# Patient Record
Sex: FEMALE | Race: WHITE
[De-identification: names, ages, dates, MRNs, and addresses within clinical notes are randomized per-mention and may not be internally consistent; named-entity substitution may affect disease eponyms.]

---

## 2017-12-20 ENCOUNTER — HOSPITAL ENCOUNTER (OUTPATIENT)
Dept: HOSPITAL 76 - EMS | Age: 77
Discharge: ANOTHER HOSPITAL | End: 2017-12-20
Attending: SURGERY
Payer: MEDICARE

## 2017-12-20 ENCOUNTER — HOSPITAL ENCOUNTER (EMERGENCY)
Dept: HOSPITAL 76 - ED | Age: 77
Discharge: HOME | End: 2017-12-20
Payer: MEDICARE

## 2017-12-20 VITALS — DIASTOLIC BLOOD PRESSURE: 74 MMHG | SYSTOLIC BLOOD PRESSURE: 137 MMHG

## 2017-12-20 DIAGNOSIS — R41.82: Primary | ICD-10-CM

## 2017-12-20 DIAGNOSIS — T50.995A: ICD-10-CM

## 2017-12-20 DIAGNOSIS — E87.6: ICD-10-CM

## 2017-12-20 DIAGNOSIS — M79.7: ICD-10-CM
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
## 2017-12-20 DIAGNOSIS — M19.90: ICD-10-CM

## 2017-12-20 DIAGNOSIS — J45.909: ICD-10-CM

## 2017-12-20 LAB
ANION GAP SERPL CALCULATED.4IONS-SCNC: 11 MMOL/L (ref 6–13)
APAP SERPL-MCNC: < 10 UG/ML (ref 10–30)
BASOPHILS NFR BLD AUTO: 0 10^3/UL (ref 0–0.1)
BASOPHILS NFR BLD AUTO: 0.6 %
BUN SERPL-MCNC: 13 MG/DL (ref 6–20)
CALCIUM UR-MCNC: 8.8 MG/DL (ref 8.5–10.3)
CHLORIDE SERPL-SCNC: 94 MMOL/L (ref 101–111)
CO2 SERPL-SCNC: 27 MMOL/L (ref 21–32)
CREAT SERPLBLD-SCNC: 0.9 MG/DL (ref 0.4–1)
EOSINOPHIL # BLD AUTO: 0.1 10^3/UL (ref 0–0.7)
EOSINOPHIL NFR BLD AUTO: 0.9 %
ERYTHROCYTE [DISTWIDTH] IN BLOOD BY AUTOMATED COUNT: 13.1 % (ref 12–15)
GFRSERPLBLD MDRD-ARVRAT: 61 ML/MIN/{1.73_M2} (ref 89–?)
GLUCOSE SERPL-MCNC: 137 MG/DL (ref 70–100)
HCT VFR BLD AUTO: 38.2 % (ref 37–47)
HGB UR QL STRIP: 13.1 G/DL (ref 12–16)
LYMPHOCYTES # SPEC AUTO: 2.6 10^3/UL (ref 1.5–3.5)
LYMPHOCYTES NFR BLD AUTO: 34.3 %
MCH RBC QN AUTO: 31.1 PG (ref 27–31)
MCHC RBC AUTO-ENTMCNC: 34.4 G/DL (ref 32–36)
MCV RBC AUTO: 90.5 FL (ref 81–99)
MONOCYTES # BLD AUTO: 0.7 10^3/UL (ref 0–1)
MONOCYTES NFR BLD AUTO: 9.7 %
NEUTROPHILS # BLD AUTO: 4.1 10^3/UL (ref 1.5–6.6)
NEUTROPHILS # SNV AUTO: 7.6 X10^3/UL (ref 4.8–10.8)
NEUTROPHILS NFR BLD AUTO: 54.5 %
NRBC # BLD AUTO: 0 /100WBC
PDW BLD AUTO: 6.9 FL (ref 7.9–10.8)
POTASSIUM SERPL-SCNC: 2.8 MMOL/L (ref 3.5–5)
RBC MAR: 4.22 10^6/UL (ref 4.2–5.4)
SALICYLATES SERPL-MCNC: < 6 MG/DL
SODIUM SERPLBLD-SCNC: 132 MMOL/L (ref 135–145)
WBC # BLD: 7.6 X10^3/UL

## 2017-12-20 PROCEDURE — 87086 URINE CULTURE/COLONY COUNT: CPT

## 2017-12-20 PROCEDURE — 96374 THER/PROPH/DIAG INJ IV PUSH: CPT

## 2017-12-20 PROCEDURE — 36415 COLL VENOUS BLD VENIPUNCTURE: CPT

## 2017-12-20 PROCEDURE — 80306 DRUG TEST PRSMV INSTRMNT: CPT

## 2017-12-20 PROCEDURE — 96361 HYDRATE IV INFUSION ADD-ON: CPT

## 2017-12-20 PROCEDURE — 85025 COMPLETE CBC W/AUTO DIFF WBC: CPT

## 2017-12-20 PROCEDURE — 80329 ANALGESICS NON-OPIOID 1 OR 2: CPT

## 2017-12-20 PROCEDURE — 99283 EMERGENCY DEPT VISIT LOW MDM: CPT

## 2017-12-20 PROCEDURE — 80320 DRUG SCREEN QUANTALCOHOLS: CPT

## 2017-12-20 PROCEDURE — 80307 DRUG TEST PRSMV CHEM ANLYZR: CPT

## 2017-12-20 PROCEDURE — 93005 ELECTROCARDIOGRAM TRACING: CPT

## 2017-12-20 PROCEDURE — 99284 EMERGENCY DEPT VISIT MOD MDM: CPT

## 2017-12-20 PROCEDURE — 81003 URINALYSIS AUTO W/O SCOPE: CPT

## 2017-12-20 PROCEDURE — 81001 URINALYSIS AUTO W/SCOPE: CPT

## 2017-12-20 PROCEDURE — 80048 BASIC METABOLIC PNL TOTAL CA: CPT

## 2017-12-20 NOTE — ED PHYSICIAN DOCUMENTATION
History of Present Illness





- Stated complaint


Stated Complaint: ALOC





- Chief complaint


Chief Complaint: Neuro





- Additonal information


Additional information: 





hx from EMS


77 female


suffers from fibromyALGIA TAKES OXYCODONE


this AM her  gave her a dose of a black substance that a friend bought 

at the marijuana store and she promptly developed AMS and NV


no fall or injury








Review of Systems


Constitutional: denies: Fever


Cardiac: denies: Chest pain / pressure


Respiratory: denies: Cough


GI: reports: Vomiting.  denies: Abdominal Pain


Neurologic: reports: Confused, Altered mental status


Endocrine: denies: Easy bruising / bleeding


Immunocompromised: denies: Immunocompromised





PD PAST MEDICAL HISTORY





- Past Medical History


Cardiovascular: None


Respiratory: Asthma


Neuro: None


Endocrine/Autoimmune: None


Musculoskeletal: Osteoarthritis, Fibromyalgia





- Past Surgical History


Past Surgical History: Yes


HEENT: Cataracts





- Present Medications


Home Medications: 


 Ambulatory Orders











 Medication  Instructions  Recorded  Confirmed


 


Fluticasone Propionate [Flovent 1 inh INH DAILY 03/30/16 12/20/17





Diskus]   


 


Fluticasone [Flonase] 1 spray VICKY BID 03/30/16 12/20/17


 


Hydrochlorothiazide 25 mg PO DAILY 03/30/16 12/20/17


 


Lisinopril 10 mg PO DAILY 03/30/16 12/20/17


 


Methocarbamol [Robaxin] 500 mg PO Q6H PRN #25 tablet 03/30/16 12/20/17


 


Salmeterol Xinafoate [Serevent 1 inhaler ORAL DAILY 03/30/16 12/20/17





Diskus]   


 


Zolpidem [Ambien] 5 mg PO DAILY 03/30/16 12/20/17


 


Ciprofloxacin HCl [Cipro] 500 mg PO BID #14 tablet 04/05/16 12/20/17


 


oxyCODONE/ACET 5/325 [Percocet 5 1 tab ORAL Q6HR PRN 04/05/16 12/20/17





mg/325 mg]   














- Allergies


Allergies/Adverse Reactions: 


 Allergies











Allergy/AdvReac Type Severity Reaction Status Date / Time


 


amoxicillin trihydrate * Allergy  Hives Verified 04/05/16 09:22





[From Augmentin]     


 


potassium clavulanate * Allergy  Hives Verified 04/05/16 09:22





[From Augmentin]     














- Social History


Does the pt smoke?: No


Smoking Status: Never smoker


Does the pt drink ETOH?: No


Does the pt have substance abuse?: No





- Immunizations


Immunizations are current?: Yes





PD ED PE NORMAL





- Vitals


Vital signs reviewed: Yes





- General


General: Other (very drowsy, irritable, uncooperative, occ jerking movements)





- HEENT


HEENT: PERRL (2)





- Neck


Neck: Supple, no meningeal sign





- Cardiac


Cardiac: RRR





- Respiratory


Respiratory: No respiratory distress, Clear bilaterally





- Abdomen


Abdomen: Soft, Non tender





- Derm


Derm: Normal color





- Neuro


Neuro: Other (wekaly moves all ext, wont follow commands or answer questions 

for better neuro exam).  No: Alert and oriented X 3





Results





- Vitals


Vitals: 


 Vital Signs - 24 hr











  12/20/17 12/20/17 12/20/17





  10:07 10:50 12:16


 


Temperature   36.1 C L


 


Heart Rate 89 62 76


 


Respiratory 18 18 16





Rate   


 


Blood Pressure 149/81 H 138/75 H 137/74 H


 


O2 Saturation 95 94 100








 Oxygen











O2 Source                      Nasal cannula

















- EKG (time done)


  ** 1047


Rate: Rate (enter#) (62)


Rhythm: NSR


Axis: Normal


Intervals: Normal DC


Ischemia: Non specific changes (diffusely slightly flat T waves)


Other comments: Other comments (NSR nl QRS nl QT (reason for getting EKG in tox 

setting))





- Labs


Labs: 


 Laboratory Tests











  12/20/17 12/20/17





  10:34 10:34


 


WBC  7.6 


 


RBC  4.22 


 


Hgb  13.1 


 


Hct  38.2 


 


MCV  90.5 


 


MCH  31.1 H 


 


MCHC  34.4 


 


RDW  13.1 


 


Plt Count  376 


 


MPV  6.9 L 


 


Neut #  4.1 


 


Lymph #  2.6 


 


Mono #  0.7 


 


Eos #  0.1 


 


Baso #  0.0 


 


Absolute Nucleated RBC  0.00 


 


Nucleated RBC %  0.0 


 


Sodium   132 L


 


Potassium   2.8 L


 


Chloride   94 L


 


Carbon Dioxide   27


 


Anion Gap   11.0


 


BUN   13


 


Creatinine   0.9


 


Estimated GFR (MDRD)   61 L


 


Glucose   137 H


 


Calcium   8.8


 


Salicylates   < 6.0


 


Acetaminophen   < 10 L


 


Ethyl Alcohol   < 5.0














PD MEDICAL DECISION MAKING





- ED course


ED course: 





family called marijuana store and learned the meds was a combo or marijuana 

deriv and nurse called poison control who advised sx are as expected for this 

ingestion and tx was simply supportive care and obs - pt given zofran and 

observed and got better


labs reviewed and fine except low K which was repleted and low Na which is not 

new





Departure





- Departure


Disposition: 01 Home, Self Care


Clinical Impression: 


Medication reaction


Qualifiers:


 Encounter type: initial encounter Qualified Code(s): T88.7XXA - Unspecified 

adverse effect of drug or medicament, initial encounter





Condition: Good


Follow-Up: 


Franc Zurita MD [Primary Care Provider] - 


Comments: 


You suffered an adverse reaction the drug that you took


The case was discussed with poison control and no further treatment is needed 

beyond observing you  until the symptoms cleared.


Your labs were fine except for low potassium which we replaced - please follow 

up with your PMD to recheck your potassium next week

## 2018-03-29 ENCOUNTER — HOSPITAL ENCOUNTER (OUTPATIENT)
Dept: HOSPITAL 76 - EMS | Age: 78
Discharge: ANOTHER HOSPITAL | End: 2018-03-29
Attending: SURGERY
Payer: MEDICARE

## 2018-03-29 ENCOUNTER — HOSPITAL ENCOUNTER (INPATIENT)
Dept: HOSPITAL 76 - ED | Age: 78
LOS: 2 days | Discharge: HOME | DRG: 194 | End: 2018-03-31
Attending: INTERNAL MEDICINE | Admitting: INTERNAL MEDICINE
Payer: MEDICARE

## 2018-03-29 DIAGNOSIS — J20.8: ICD-10-CM

## 2018-03-29 DIAGNOSIS — Z79.51: ICD-10-CM

## 2018-03-29 DIAGNOSIS — J44.0: ICD-10-CM

## 2018-03-29 DIAGNOSIS — T48.6X5A: ICD-10-CM

## 2018-03-29 DIAGNOSIS — M79.7: ICD-10-CM

## 2018-03-29 DIAGNOSIS — R19.7: ICD-10-CM

## 2018-03-29 DIAGNOSIS — R09.02: ICD-10-CM

## 2018-03-29 DIAGNOSIS — R00.0: ICD-10-CM

## 2018-03-29 DIAGNOSIS — I10: ICD-10-CM

## 2018-03-29 DIAGNOSIS — E87.1: ICD-10-CM

## 2018-03-29 DIAGNOSIS — J44.1: ICD-10-CM

## 2018-03-29 DIAGNOSIS — Z87.891: ICD-10-CM

## 2018-03-29 DIAGNOSIS — J45.901: ICD-10-CM

## 2018-03-29 DIAGNOSIS — Z66: ICD-10-CM

## 2018-03-29 DIAGNOSIS — Z79.899: ICD-10-CM
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
## 2018-03-29 DIAGNOSIS — J10.1: Primary | ICD-10-CM

## 2018-03-29 DIAGNOSIS — M19.90: ICD-10-CM

## 2018-03-29 DIAGNOSIS — R05: Primary | ICD-10-CM

## 2018-03-29 DIAGNOSIS — R41.0: ICD-10-CM

## 2018-03-29 DIAGNOSIS — E87.6: ICD-10-CM

## 2018-03-29 DIAGNOSIS — Z79.891: ICD-10-CM

## 2018-03-29 DIAGNOSIS — J20.9: ICD-10-CM

## 2018-03-29 LAB
ANION GAP SERPL CALCULATED.4IONS-SCNC: 11 MMOL/L (ref 6–13)
ARTERIAL PATENCY WRIST A: POSITIVE
BASE EXCESS BLDMV CALC-SCNC: 4.7 MMOL/L (ref -2–3)
BASOPHILS NFR BLD AUTO: 0 10^3/UL (ref 0–0.1)
BASOPHILS NFR BLD AUTO: 0.2 %
BUN SERPL-MCNC: 18 MG/DL (ref 6–20)
CALCIUM UR-MCNC: 8.5 MG/DL (ref 8.5–10.3)
CHLORIDE SERPL-SCNC: 86 MMOL/L (ref 101–111)
CO2 BLDA CALC-SCNC: 29.3 MMOL/L (ref 21–29)
CO2 SERPL-SCNC: 26 MMOL/L (ref 21–32)
CREAT SERPLBLD-SCNC: 1.1 MG/DL (ref 0.4–1)
DEPRECATED HCO3 PLAS-SCNC: 28.2 MMOL/L (ref 22–26)
EOSINOPHIL # BLD AUTO: 0 10^3/UL (ref 0–0.7)
EOSINOPHIL NFR BLD AUTO: 0 %
ERYTHROCYTE [DISTWIDTH] IN BLOOD BY AUTOMATED COUNT: 13.4 % (ref 12–15)
GFRSERPLBLD MDRD-ARVRAT: 48 ML/MIN/{1.73_M2} (ref 89–?)
GLUCOSE SERPL-MCNC: 122 MG/DL (ref 70–100)
HGB UR QL STRIP: 13 G/DL (ref 12–16)
INFLUENZA A & B AG INTERPRET: (no result)
LYMPHOCYTES # SPEC AUTO: 0.4 10^3/UL (ref 1.5–3.5)
LYMPHOCYTES NFR BLD AUTO: 6.1 %
MCH RBC QN AUTO: 31.4 PG (ref 27–31)
MCHC RBC AUTO-ENTMCNC: 35.3 G/DL (ref 32–36)
MCV RBC AUTO: 89 FL (ref 81–99)
MONOCYTES # BLD AUTO: 0.3 10^3/UL (ref 0–1)
MONOCYTES NFR BLD AUTO: 4.1 %
NEUTROPHILS # BLD AUTO: 5.6 10^3/UL (ref 1.5–6.6)
NEUTROPHILS # SNV AUTO: 6.3 X10^3/UL (ref 4.8–10.8)
NEUTROPHILS NFR BLD AUTO: 89.6 %
PCO2 TEMP ADJ BLDCOA: 38 MMHG (ref 34–45)
PDW BLD AUTO: 6.9 FL (ref 7.9–10.8)
PH TEMP ADJ BLDA: 7.49 [PH] (ref 7.35–7.45)
PLATELET # BLD: 238 10^3/UL (ref 130–450)
PO2 TEMP ADJ BLDCOA: 94 MMHG (ref 80–100)
RBC MAR: 4.15 10^6/UL (ref 4.2–5.4)
SAO2 % BLDA FROM PO2: 97 % (ref 94–98)
SODIUM SERPLBLD-SCNC: 123 MMOL/L (ref 135–145)

## 2018-03-29 PROCEDURE — 94640 AIRWAY INHALATION TREATMENT: CPT

## 2018-03-29 PROCEDURE — 87205 SMEAR GRAM STAIN: CPT

## 2018-03-29 PROCEDURE — 36600 WITHDRAWAL OF ARTERIAL BLOOD: CPT

## 2018-03-29 PROCEDURE — 87275 INFLUENZA B AG IF: CPT

## 2018-03-29 PROCEDURE — 87493 C DIFF AMPLIFIED PROBE: CPT

## 2018-03-29 PROCEDURE — 82803 BLOOD GASES ANY COMBINATION: CPT

## 2018-03-29 PROCEDURE — 71045 X-RAY EXAM CHEST 1 VIEW: CPT

## 2018-03-29 PROCEDURE — 99283 EMERGENCY DEPT VISIT LOW MDM: CPT

## 2018-03-29 PROCEDURE — 80048 BASIC METABOLIC PNL TOTAL CA: CPT

## 2018-03-29 PROCEDURE — 80053 COMPREHEN METABOLIC PANEL: CPT

## 2018-03-29 PROCEDURE — 87046 STOOL CULTR AEROBIC BACT EA: CPT

## 2018-03-29 PROCEDURE — 94761 N-INVAS EAR/PLS OXIMETRY MLT: CPT

## 2018-03-29 PROCEDURE — 36415 COLL VENOUS BLD VENIPUNCTURE: CPT

## 2018-03-29 PROCEDURE — 87045 FECES CULTURE AEROBIC BACT: CPT

## 2018-03-29 PROCEDURE — 71046 X-RAY EXAM CHEST 2 VIEWS: CPT

## 2018-03-29 PROCEDURE — 87070 CULTURE OTHR SPECIMN AEROBIC: CPT

## 2018-03-29 PROCEDURE — 99284 EMERGENCY DEPT VISIT MOD MDM: CPT

## 2018-03-29 PROCEDURE — 93005 ELECTROCARDIOGRAM TRACING: CPT

## 2018-03-29 PROCEDURE — 87276 INFLUENZA A AG IF: CPT

## 2018-03-29 PROCEDURE — 87040 BLOOD CULTURE FOR BACTERIA: CPT

## 2018-03-29 PROCEDURE — 85025 COMPLETE CBC W/AUTO DIFF WBC: CPT

## 2018-03-29 RX ADMIN — BUDESONIDE SCH MG: 0.5 SUSPENSION RESPIRATORY (INHALATION) at 20:31

## 2018-03-29 RX ADMIN — OSELTAMIVIR PHOSPHATE SCH MG: 75 CAPSULE ORAL at 14:04

## 2018-03-29 RX ADMIN — POTASSIUM CHLORIDE SCH MLS/HR: 7.46 INJECTION, SOLUTION INTRAVENOUS at 22:35

## 2018-03-29 RX ADMIN — LEVALBUTEROL SCH MG: 1.25 SOLUTION RESPIRATORY (INHALATION) at 16:32

## 2018-03-29 RX ADMIN — METHYLPREDNISOLONE SODIUM SUCCINATE SCH MG: 40 INJECTION, POWDER, FOR SOLUTION INTRAMUSCULAR; INTRAVENOUS at 21:13

## 2018-03-29 RX ADMIN — CLINDAMYCIN PHOSPHATE SCH MLS/HR: 600 INJECTION, SOLUTION INTRAVENOUS at 19:57

## 2018-03-29 RX ADMIN — METHYLPREDNISOLONE SODIUM SUCCINATE SCH MG: 40 INJECTION, POWDER, FOR SOLUTION INTRAMUSCULAR; INTRAVENOUS at 14:05

## 2018-03-29 RX ADMIN — SODIUM CHLORIDE, PRESERVATIVE FREE SCH ML: 5 INJECTION INTRAVENOUS at 19:57

## 2018-03-29 RX ADMIN — SODIUM CHLORIDE SCH MLS/HR: 9 INJECTION, SOLUTION INTRAVENOUS at 21:10

## 2018-03-29 RX ADMIN — LEVALBUTEROL SCH MG: 1.25 SOLUTION RESPIRATORY (INHALATION) at 20:31

## 2018-03-29 RX ADMIN — OSELTAMIVIR PHOSPHATE SCH MG: 75 CAPSULE ORAL at 21:10

## 2018-03-29 RX ADMIN — BUDESONIDE SCH MG: 0.5 SUSPENSION RESPIRATORY (INHALATION) at 13:24

## 2018-03-29 RX ADMIN — ALBUTEROL SULFATE STA MG: 2.5 SOLUTION RESPIRATORY (INHALATION) at 10:46

## 2018-03-29 RX ADMIN — LEVALBUTEROL SCH MG: 1.25 SOLUTION RESPIRATORY (INHALATION) at 13:24

## 2018-03-29 RX ADMIN — SODIUM CHLORIDE SCH MLS/HR: 9 INJECTION, SOLUTION INTRAVENOUS at 14:09

## 2018-03-29 RX ADMIN — ALBUTEROL SULFATE STA: 2.5 SOLUTION RESPIRATORY (INHALATION) at 14:37

## 2018-03-29 NOTE — ED PHYSICIAN DOCUMENTATION
History of Present Illness





- Stated complaint


Stated Complaint: COUGH





- Chief complaint


Chief Complaint: Resp





- Additonal information


Additional information: 





hx from pt


77 f


has COPD 


states dx with pna 3 d ago at PMD - pt is having a hard time recalling details 

- she does not recall if she had a CXR, she does not recall the name of the 

antibiotic she was started on, she does not recall if she is on oral steroids, 

she tells me that she is using her albuterol every 4 hr but her last use was 

last night


states her SO will be arriving with more info


to ER BIBA for cough and SOA


maybe a fever


no NV


no leg edema








Review of Systems


Constitutional: reports: Fatigue.  denies: Fever


Throat: denies: Sore throat


Cardiac: denies: Chest pain / pressure


Respiratory: reports: Dyspnea, Cough


GI: denies: Abdominal Pain, Nausea, Vomiting


Musculoskeletal: denies: Extremity swelling


Neurologic: denies: Generalized weakness


Endocrine: denies: Easy bruising / bleeding


Immunocompromised: denies: Immunocompromised





PD PAST MEDICAL HISTORY





- Past Medical History


Cardiovascular: None


Respiratory: Asthma


Neuro: None


Endocrine/Autoimmune: None


Musculoskeletal: Osteoarthritis, Fibromyalgia





- Past Surgical History


Past Surgical History: Yes


HEENT: Cataracts





- Present Medications


Home Medications: 


 Ambulatory Orders











 Medication  Instructions  Recorded  Confirmed


 


Fluticasone [Flonase] 2 spray VICKY DAILY PRN 03/30/16 03/29/18


 


Salmeterol Xinafoate [Serevent 1 puffs INH DAILY 03/30/16 03/29/18





Diskus]   


 


oxyCODONE/ACET 5/325 [Percocet 5 1 tab ORAL Q6HR PRN 04/05/16 03/29/18





mg/325 mg]   


 


Albuterol Sulfate [Proair Hfa 2 puffs INH Q4H PRN 03/29/18 03/29/18





Inhaler]   


 


Esomeprazole Magnesium [Nexium] 20 mg PO QDAC 03/29/18 03/29/18


 


Felodipine [Felodipine ER] 5 mg PO DAILY 03/29/18 03/29/18


 


Fluticasone 220 Mcg [Flovent] 2 puffs INH BID 03/29/18 03/29/18


 


Losartan Potassium [Losartan 100 mg PO DAILY 03/29/18 03/29/18





Potassium]   


 


Montelukast Sodium 10 mg PO QPM 03/29/18 03/29/18


 


hydroCHLOROthiazide [Hydrodiuril] 25 mg PO DAILY 03/29/18 03/29/18














- Allergies


Allergies/Adverse Reactions: 


 Allergies











Allergy/AdvReac Type Severity Reaction Status Date / Time


 


amoxicillin trihydrate * Allergy  Hives Verified 03/29/18 06:09





[From Augmentin]     


 


potassium clavulanate * Allergy  Hives Verified 03/29/18 06:09





[From Augmentin]     


 


codeine AdvReac Intermediate Nausea Verified 03/29/18 17:26














- Social History


Does the pt smoke?: No


Smoking Status: Never smoker


Does the pt drink ETOH?: No


Does the pt have substance abuse?: No





- Immunizations


Immunizations are current?: Yes





PD ED PE NORMAL





- Vitals


Vital signs reviewed: Yes





- General


General: Alert and oriented X 3





- Neck


Neck: Supple, no meningeal sign





- Cardiac


Cardiac: RRR





- Respiratory


Respiratory: Other (incessant coarse deep wet cough, laureen wheeze, sat 89% on 2 L 

with coughing spells, 93% otherwise)





- Abdomen


Abdomen: Soft, Non tender





- Back


Back: No CVA TTP





- Derm


Derm: Normal color





- Extremities


Extremities: No edema, No calf tenderness / cord





Results





- Vitals


Vitals: 


 Vital Signs - 24 hr











  03/29/18 03/29/18 03/29/18





  06:04 07:05 08:17


 


Temperature 37.3 C  


 


Heart Rate 79 81 86


 


Respiratory 20 26 H 24





Rate   


 


Blood Pressure 139/65 H  118/58 L


 


O2 Saturation 91 L  92














  03/29/18 03/29/18 03/29/18





  08:54 09:30 10:08


 


Temperature   


 


Heart Rate 94 77 105 H


 


Respiratory 20 22 





Rate   


 


Blood Pressure 127/66  


 


O2 Saturation 93  98














  03/29/18





  12:13


 


Temperature 36.4 C L


 


Heart Rate 79


 


Respiratory 20





Rate 


 


Blood Pressure 130/65


 


O2 Saturation 90 L








 Oxygen











O2 Source                      Room air


 


Oxygen Flow Rate               2

















- Labs


Labs: 


 Laboratory Tests











  03/29/18 03/29/18 03/29/18





  09:04 09:04 09:10


 


WBC  6.3  


 


RBC  4.15 L  


 


Hgb  13.0  


 


Hct  37.0  


 


MCV  89.0  


 


MCH  31.4 H  


 


MCHC  35.3  


 


RDW  13.4  


 


Plt Count  238  


 


MPV  6.9 L  


 


Neut #  5.6  


 


Lymph #  0.4 L  


 


Mono #  0.3  


 


Eos #  0.0  


 


Baso #  0.0  


 


Absolute Nucleated RBC  0.01  


 


Nucleated RBC %  0.1  


 


Bld Gas Analysis Time    0910


 


Sample Site    RIGHT RADIAL


 


ABG pH    7.49 H


 


ABG pCO2    38


 


ABG pO2    94


 


ABG HCO3    28.2 H


 


ABG Total CO2    29.3 H


 


ABG O2 Saturation    97


 


ABG Base Excess    4.7 H


 


Pernell Test    POSITIVE


 


O2 Delivery Device    NASAL CANNULA


 


O2 Liters/Min    3.50


 


Sodium   123 L 


 


Potassium   2.9 L 


 


Chloride   86 L 


 


Carbon Dioxide   26 


 


Anion Gap   11.0 


 


BUN   18 


 


Creatinine   1.1 H 


 


Estimated GFR (MDRD)   48 L 


 


Glucose   122 H 


 


Calcium   8.5 


 


Influenza A (Rapid)   


 


Influenza B (Rapid)   


 


Influenza Types A,B Ag   














  03/29/18





  10:10


 


WBC 


 


RBC 


 


Hgb 


 


Hct 


 


MCV 


 


MCH 


 


MCHC 


 


RDW 


 


Plt Count 


 


MPV 


 


Neut # 


 


Lymph # 


 


Mono # 


 


Eos # 


 


Baso # 


 


Absolute Nucleated RBC 


 


Nucleated RBC % 


 


Bld Gas Analysis Time 


 


Sample Site 


 


ABG pH 


 


ABG pCO2 


 


ABG pO2 


 


ABG HCO3 


 


ABG Total CO2 


 


ABG O2 Saturation 


 


ABG Base Excess 


 


Pernell Test 


 


O2 Delivery Device 


 


O2 Liters/Min 


 


Sodium 


 


Potassium 


 


Chloride 


 


Carbon Dioxide 


 


Anion Gap 


 


BUN 


 


Creatinine 


 


Estimated GFR (MDRD) 


 


Glucose 


 


Calcium 


 


Influenza A (Rapid)  Negative


 


Influenza B (Rapid)  POSITIVE H


 


Influenza Types A,B Ag  + H














- Rads (name of study)


  ** CXR


Radiology: See rad report (linear LLL opacity unchnaged from prior c/w scarring 

or atelectasis and not pna)





PD MEDICAL DECISION MAKING





- ED course


ED course: 





despite 3 nebs and steroids pt still hypoxic even at rest and still tight and 

wheezy


SO is here - she is on zmax, she was not on outpt steroids


rec admit t is reluctant


will try another triple neb


pt seems confused - will check PCO2





added on baseline labs for anticipated admit - K low and repleted - Na low - 

could be 2/2 lung dz but new from Dec and with AMS concerning





still wheezing and hypoxic after 6 nebs





will admit





paged hospitalist at 11 AM








Departure





- Departure


Disposition: 66 [facility] DC/Xfer


Clinical Impression: 


 Hypoxia, Bronchitis, Hyponatremia, Hypokalemia





COPD (chronic obstructive pulmonary disease)


Qualifiers:


 COPD type: COPD with acute exacerbation Qualified Code(s): J44.1 - Chronic 

obstructive pulmonary disease with (acute) exacerbation





Condition: Fair


Discharge Date/Time: 03/29/18 12:50

## 2018-03-29 NOTE — HISTORY & PHYSICAL EXAMINATION
DATE OF SERVICE: 03/29/2018

Physician: Brigitte Tamayo MD

 

HISTORY OF PRESENT ILLNESS:  This is a 77-year-old white female with a history 
of hypertension,

fibromyalgia, and chronic obstructive disease/asthma.  The patient also gets 
frequent 

sinusitis, according to the daughter.  The patient is admitted with 3 to 4-day 
history of a wet

but nonproductive cough, shortness of breath, and fever after returning from a 
trip to [place]. 

She traveled with her , and the  also had a mild URI plus 
diarrhea.  The patient 

came to the emergency room because of severe shortness of breath. The patient 
has been using 

her inhaler every 4 hours for a day. She forgot to tell the emergency room that 
she also has had 

diarrhea for a few days.  In the emergency room, she received oral steroids and 
6 nebulizer 

treatments and continued to be short of breath at rest, wheezing and was "tight
", and is being 

admitted for respiratory distress.

 

PAST MEDICAL HISTORY:  Chronic obstructive pulmonary disease/asthma on several 
inhalers, but 

not on home oxygen; history of frequent sinusitis, history of fibromyalgia, and 
hypertension.

 

REVIEW OF SYSTEMS:  A comprehensive review of systems was performed, and the 
pertinent 

positives are above.  In addition, the daughter states there was approximately 
1 year where she

had recurrent syncope, but this is now resolved.  

 

ALLERGIES

1.  AMOXICILLIN.

2.  POTASSIUM CLAVULANATE.

 

MEDICATIONS

1.  Nexium 20 mg p.o. daily.

2.  ProAir inhaler every 4 hours p.r.n.

3.  Flovent 2 puffs b.i.d.

4.  Serevent Diskus 1 puff daily.

5.  Flonase nasal spray daily p.r.n.

6.  Percocet q.6 hours p.r.n. fibromyalgia pain.

7.  HCTZ 25 mg daily.

8.  Montelukast 10 mg p.o. every evening.

9.  Losartan 100 mg p.o. daily.

10.  Felodipine 5 mg p.o. daily.

 

SOCIAL HISTORY:  The patient is a nonsmoker, who quit 40 years ago, drinks rare 
alcohol, uses 

no illicit drugs.  She lives at home with her .  She is a retired legal 
assistant.

 

FAMILY HISTORY:  No inherited diseases.

 

PHYSICAL EXAMINATION

GENERAL:  She is in mild respiratory distress.  In the emergency room, she was 
in severe 

respiratory distress with a respiratory rate of 26.  Blood pressure 139/65, 
pulse of  in 

sinus rhythm, afebrile, and room air saturation 91%.

HEENT:  Exam reveals hoarseness of her voice.  Moist oral mucosa.

NECK:  No JVD or carotid bruits.

LUNGS:  Diffusely diminished breath sounds with prolonged expiratory phase and 
scattered 

wheezes, no rales.

HEART:  Sounds distant.

ABDOMEN:  Soft, positive bowel sounds.

EXTREMITIES:  No clubbing, cyanosis, or edema.

NEUROLOGIC:  Intact grossly.

 

LABORATORY/DATA

Sodium 123, potassium 2.9, BUN 18, creatinine 1.1.  No liver tests or calcium 
or magnesium were

done.  White blood count 6.3 with no left shift, hemoglobin 13, platelet count 
normal at 238.  

No INR was done.  Serology shows positive influenza B.  Blood gas had a pH of 
7.49, pCO2 of 38,

pO2 of 94.

 

Chest x-ray showed left base atelectasis or scar and no acute disease and heart 
size is upper 

limits of normal.

 

EKG:  Normal sinus rhythm at a rate of 80, poor R-wave progression, diffusely 
borderline flat T

waves.  There is no old EKG for comparison.

 

IMPRESSION/DIAGNOSES

1.  Chronic obstructive pulmonary disease exacerbation.

2.  Acute bronchitis.

3.  Influenza B.

4.  Diarrhea after a trip to [place].

5.  Tachycardia after albuterol, which has now improved.

6.  Hypertension.

7.  Hyponatremia.

8.  Confusion.

9.  Fibromyalgia.

 

PLAN

1.  Admit the patient on telemetry.

2.  Obtain sputum cultures and blood cultures given the fever at home.

3.  Start the patient on empiric antibiotics to cover bronchitis and especially 
from a sinus 

source; therefore, Zithromax and Clindamycin will be started.

4.  Start Tamiflu for the influenza and follow her liver tests.

5.  Begin IV steroids, nebulizers with Xopenex and Budesonide, use Mucinex and 
Robitussin 

treatments.

6.  Continue with her blood pressure medications, but stop the HCTZ; and begin 
low-dose normal 

saline to correct the hyponatremia.  Replace her potassium.

7.  Begin clear liquid diet and advance if the diarrhea will tolerate.  Culture 
of the 

diarrhea, and begin Imodium or Florastor if there are no bacterial growths or 
C. diff.

 

CODE STATUS:  DNR.

 

DEEP VENOUS THROMBOSIS PROPHYLAXIS:  Lovenox.

 

ATTESTATION:  The patient is expected to be discharged or transferred to 
another facility 

within 96 hours:  Yes.

 

 

DD: 03/29/2018 16:01

TD: 03/29/2018 16:30

Job #: [id number]

[place]

## 2018-03-29 NOTE — XRAY REPORT
EXAM:

CHEST RADIOGRAPHY

 

EXAM DATE: 3/29/2018 07:03 AM.

 

CLINICAL HISTORY: COUGH.

 

COMPARISON: 01/09/2017.

 

TECHNIQUE: 2 views.

 

FINDINGS: 

Lungs/Pleura: Linear left lower lobe opacity again seen. No vascular congestion or consolidation. No 
pneumothorax. No pleural effusions.

 

Mediastinum: Heart size upper normal. Aorta is tortuous. Aortic atherosclerosis.

 

Other: Degenerative changes of the thoracic spine.

 

IMPRESSION: 

1. Linear left lower lobe opacity favoring scar/atelectasis without significant change compared to 01
/09/2017. 

2. No new acute disease.

 

[facility]

Referring Provider Line: [phone]

 

SITE ID: [id number]

## 2018-03-30 LAB
ALBUMIN DIAFP-MCNC: 3.3 G/DL (ref 3.2–5.5)
ALBUMIN/GLOB SERPL: 1.2 {RATIO} (ref 1–2.2)
ALP SERPL-CCNC: 34 IU/L (ref 42–121)
ALT SERPL W P-5'-P-CCNC: 26 IU/L (ref 10–60)
ANION GAP SERPL CALCULATED.4IONS-SCNC: 9 MMOL/L (ref 6–13)
AST SERPL W P-5'-P-CCNC: 46 IU/L (ref 10–42)
BASOPHILS NFR BLD AUTO: 0 %
BASOPHILS NFR BLD AUTO: 0 10^3/UL (ref 0–0.1)
BILIRUB BLD-MCNC: 0.5 MG/DL (ref 0.2–1)
BUN SERPL-MCNC: 13 MG/DL (ref 6–20)
CALCIUM UR-MCNC: 8.3 MG/DL (ref 8.5–10.3)
CHLORIDE SERPL-SCNC: 92 MMOL/L (ref 101–111)
CO2 SERPL-SCNC: 27 MMOL/L (ref 21–32)
CREAT SERPLBLD-SCNC: 0.7 MG/DL (ref 0.4–1)
EOSINOPHIL # BLD AUTO: 0 10^3/UL (ref 0–0.7)
EOSINOPHIL NFR BLD AUTO: 0 %
ERYTHROCYTE [DISTWIDTH] IN BLOOD BY AUTOMATED COUNT: 13.5 % (ref 12–15)
GFRSERPLBLD MDRD-ARVRAT: 81 ML/MIN/{1.73_M2} (ref 89–?)
GLOBULIN SER-MCNC: 2.8 G/DL (ref 2.1–4.2)
GLUCOSE SERPL-MCNC: 153 MG/DL (ref 70–100)
HGB UR QL STRIP: 11.9 G/DL (ref 12–16)
LYMPHOCYTES # SPEC AUTO: 0.6 10^3/UL (ref 1.5–3.5)
LYMPHOCYTES NFR BLD AUTO: 8.1 %
MCH RBC QN AUTO: 30.2 PG (ref 27–31)
MCHC RBC AUTO-ENTMCNC: 33.6 G/DL (ref 32–36)
MCV RBC AUTO: 89.9 FL (ref 81–99)
MONOCYTES # BLD AUTO: 0.7 10^3/UL (ref 0–1)
MONOCYTES NFR BLD AUTO: 9.3 %
NEUTROPHILS # BLD AUTO: 6.2 10^3/UL (ref 1.5–6.6)
NEUTROPHILS # SNV AUTO: 7.5 X10^3/UL (ref 4.8–10.8)
NEUTROPHILS NFR BLD AUTO: 82.6 %
PDW BLD AUTO: 7.1 FL (ref 7.9–10.8)
PLATELET # BLD: 225 10^3/UL (ref 130–450)
PROT SPEC-MCNC: 6.1 G/DL (ref 6.7–8.2)
RBC MAR: 3.94 10^6/UL (ref 4.2–5.4)
SODIUM SERPLBLD-SCNC: 128 MMOL/L (ref 135–145)

## 2018-03-30 RX ADMIN — OSELTAMIVIR PHOSPHATE SCH MG: 75 CAPSULE ORAL at 08:57

## 2018-03-30 RX ADMIN — SODIUM CHLORIDE SCH: 9 INJECTION, SOLUTION INTRAVENOUS at 06:10

## 2018-03-30 RX ADMIN — BUDESONIDE SCH MG: 0.5 SUSPENSION RESPIRATORY (INHALATION) at 07:25

## 2018-03-30 RX ADMIN — CLINDAMYCIN PHOSPHATE SCH MLS/HR: 600 INJECTION, SOLUTION INTRAVENOUS at 17:35

## 2018-03-30 RX ADMIN — POTASSIUM CHLORIDE SCH: 7.46 INJECTION, SOLUTION INTRAVENOUS at 00:35

## 2018-03-30 RX ADMIN — BUDESONIDE SCH MG: 0.5 SUSPENSION RESPIRATORY (INHALATION) at 19:15

## 2018-03-30 RX ADMIN — ENOXAPARIN SODIUM SCH MG: 100 INJECTION SUBCUTANEOUS at 08:58

## 2018-03-30 RX ADMIN — POTASSIUM CHLORIDE SCH MEQ: 1500 TABLET, EXTENDED RELEASE ORAL at 21:25

## 2018-03-30 RX ADMIN — SODIUM CHLORIDE, PRESERVATIVE FREE SCH: 5 INJECTION INTRAVENOUS at 09:28

## 2018-03-30 RX ADMIN — LOSARTAN POTASSIUM SCH MG: 50 TABLET, FILM COATED ORAL at 08:57

## 2018-03-30 RX ADMIN — SODIUM CHLORIDE SCH MLS/HR: 9 INJECTION, SOLUTION INTRAVENOUS at 07:22

## 2018-03-30 RX ADMIN — POTASSIUM CHLORIDE SCH: 7.46 INJECTION, SOLUTION INTRAVENOUS at 09:27

## 2018-03-30 RX ADMIN — METHYLPREDNISOLONE SODIUM SUCCINATE SCH MG: 40 INJECTION, POWDER, FOR SOLUTION INTRAMUSCULAR; INTRAVENOUS at 21:24

## 2018-03-30 RX ADMIN — POLYETHYLENE GLYCOL 3350 SCH: 17 POWDER, FOR SOLUTION ORAL at 08:59

## 2018-03-30 RX ADMIN — LEVALBUTEROL SCH MG: 1.25 SOLUTION RESPIRATORY (INHALATION) at 07:25

## 2018-03-30 RX ADMIN — LEVALBUTEROL SCH MG: 1.25 SOLUTION RESPIRATORY (INHALATION) at 19:15

## 2018-03-30 RX ADMIN — POTASSIUM CHLORIDE SCH MEQ: 1500 TABLET, EXTENDED RELEASE ORAL at 08:57

## 2018-03-30 RX ADMIN — LEVALBUTEROL SCH MG: 1.25 SOLUTION RESPIRATORY (INHALATION) at 11:09

## 2018-03-30 RX ADMIN — PANTOPRAZOLE SODIUM SCH MG: 40 TABLET, DELAYED RELEASE ORAL at 06:21

## 2018-03-30 RX ADMIN — SODIUM CHLORIDE, PRESERVATIVE FREE SCH ML: 5 INJECTION INTRAVENOUS at 21:24

## 2018-03-30 RX ADMIN — CLINDAMYCIN PHOSPHATE SCH MLS/HR: 600 INJECTION, SOLUTION INTRAVENOUS at 09:54

## 2018-03-30 RX ADMIN — OSELTAMIVIR PHOSPHATE SCH MG: 75 CAPSULE ORAL at 21:25

## 2018-03-30 RX ADMIN — SODIUM CHLORIDE, PRESERVATIVE FREE SCH: 5 INJECTION INTRAVENOUS at 01:55

## 2018-03-30 RX ADMIN — SODIUM CHLORIDE SCH MLS/HR: 9 INJECTION, SOLUTION INTRAVENOUS at 23:04

## 2018-03-30 RX ADMIN — METHYLPREDNISOLONE SODIUM SUCCINATE SCH MG: 40 INJECTION, POWDER, FOR SOLUTION INTRAMUSCULAR; INTRAVENOUS at 06:20

## 2018-03-30 RX ADMIN — SODIUM CHLORIDE, PRESERVATIVE FREE SCH ML: 5 INJECTION INTRAVENOUS at 17:35

## 2018-03-30 RX ADMIN — CLINDAMYCIN PHOSPHATE SCH MLS/HR: 600 INJECTION, SOLUTION INTRAVENOUS at 02:14

## 2018-03-30 RX ADMIN — METHYLPREDNISOLONE SODIUM SUCCINATE SCH MG: 40 INJECTION, POWDER, FOR SOLUTION INTRAMUSCULAR; INTRAVENOUS at 13:25

## 2018-03-30 RX ADMIN — FELODIPINE SCH MG: 2.5 TABLET, FILM COATED, EXTENDED RELEASE ORAL at 08:56

## 2018-03-30 NOTE — XRAY REPORT
EXAM: 

CHEST RADIOGRAPHY, PORTABLE ONE VIEW

 

EXAM DATE: 03/30/2018 06:37 AM.

 

CLINICAL HISTORY: 77-year-old female with shortness of breath.

 

COMPARISON: Two-view chest 03/29/2018 and previous.

 

TECHNIQUE: 0627 hour AP upright portable view.

 

FINDINGS:

Lungs/Pleura: No focal opacities evident. No pleural effusion. No pneumothorax. Decreased inspiratory
 effort from previous study.

 

Mediastinum: Within exam limitations, the cardiomediastinal contour is normal. No pulmonary vascular 
congestion or adenopathy.

 

Other: Trachea is midline. Osseous structures are unremarkable.

 

Moderate elevation right hemidiaphragm, chronic and stable.

 

IMPRESSION: Suboptimal inspiratory effort. No pneumonia, CHF or demonstrated cause for shortness of b
reath.

 

[facility]

Referring Provider Line: [phone]

 

SITE ID: [id number]

## 2018-03-30 NOTE — XRAY PRELIMINARY REPORT
Accession: [id number]

Exam: XR CHEST 1 VIEW X-RAY

 

IMPRESSION: Suboptimal inspiratory effort. No pneumonia, CHF further demonstrated cause for shortness
 of breath.

 

[facility]

 

SITE ID: [id number]

## 2018-03-31 VITALS — SYSTOLIC BLOOD PRESSURE: 135 MMHG | DIASTOLIC BLOOD PRESSURE: 70 MMHG

## 2018-03-31 RX ADMIN — LEVALBUTEROL SCH MG: 1.25 SOLUTION RESPIRATORY (INHALATION) at 07:45

## 2018-03-31 RX ADMIN — CLINDAMYCIN PHOSPHATE SCH MLS/HR: 600 INJECTION, SOLUTION INTRAVENOUS at 01:42

## 2018-03-31 RX ADMIN — BUDESONIDE SCH MG: 0.5 SUSPENSION RESPIRATORY (INHALATION) at 07:45

## 2018-03-31 RX ADMIN — PANTOPRAZOLE SODIUM SCH MG: 40 TABLET, DELAYED RELEASE ORAL at 07:05

## 2018-03-31 RX ADMIN — METHYLPREDNISOLONE SODIUM SUCCINATE SCH MG: 40 INJECTION, POWDER, FOR SOLUTION INTRAMUSCULAR; INTRAVENOUS at 14:33

## 2018-03-31 RX ADMIN — CLINDAMYCIN PHOSPHATE SCH MLS/HR: 600 INJECTION, SOLUTION INTRAVENOUS at 09:11

## 2018-03-31 RX ADMIN — LOSARTAN POTASSIUM SCH MG: 50 TABLET, FILM COATED ORAL at 09:11

## 2018-03-31 RX ADMIN — POLYETHYLENE GLYCOL 3350 SCH: 17 POWDER, FOR SOLUTION ORAL at 09:12

## 2018-03-31 RX ADMIN — FELODIPINE SCH MG: 2.5 TABLET, FILM COATED, EXTENDED RELEASE ORAL at 09:11

## 2018-03-31 RX ADMIN — SODIUM CHLORIDE, PRESERVATIVE FREE SCH: 5 INJECTION INTRAVENOUS at 09:12

## 2018-03-31 RX ADMIN — ENOXAPARIN SODIUM SCH MG: 100 INJECTION SUBCUTANEOUS at 09:10

## 2018-03-31 RX ADMIN — SODIUM CHLORIDE SCH MLS/HR: 9 INJECTION, SOLUTION INTRAVENOUS at 01:46

## 2018-03-31 RX ADMIN — METHYLPREDNISOLONE SODIUM SUCCINATE SCH MG: 40 INJECTION, POWDER, FOR SOLUTION INTRAMUSCULAR; INTRAVENOUS at 07:05

## 2018-03-31 RX ADMIN — POTASSIUM CHLORIDE SCH MEQ: 1500 TABLET, EXTENDED RELEASE ORAL at 09:11

## 2018-03-31 RX ADMIN — OSELTAMIVIR PHOSPHATE SCH MG: 75 CAPSULE ORAL at 09:11

## 2018-03-31 RX ADMIN — SODIUM CHLORIDE, PRESERVATIVE FREE SCH: 5 INJECTION INTRAVENOUS at 00:04

## 2018-03-31 NOTE — DISCHARGE PLAN
Discharge Plan


Disposition: 01 Home, Self Care


Condition: Stable


Prescriptions: 


Clindamycin HCl [Clindamycin 300MG CAP] 300 mg PO BID #8 capsule


Methylprednisolone [Medrol] 4 mg PO DAILY #1 tab.ds.pk


Oseltamivir [Tamiflu] 75 mg PO BID #8 capsule


Diet: Regular


Activity Restrictions: Activity as Tolerated


Shower Restrictions: No


Driving Restrictions: No


Instruction Topics:  Breathing Pursed Lip Dc, Asthma Control, Chronic Lung 

Disease Irritants, Chron Lung Disease Exercise Safely, Vaccination Flu, Flu


Additional Instructions or Follow Up instructions: 


See your Primary Care Provider in 7-10 days in follow-up.





Take the new medications as prescribed, til they are done.





Resume all your pre-hospital medications EXCEPT STOP the Hydrochlorothiazide, 

which dehydrates you.





Advance your diet to full diet as tolerated.





No Smoking: If you smoke, Please STOP!  Call [phone] for help.


Follow-up with: 


Franc Zurita MD [Primary Care Provider] -

## 2018-04-05 ENCOUNTER — HOSPITAL ENCOUNTER (EMERGENCY)
Dept: HOSPITAL 76 - ED | Age: 78
Discharge: HOME | End: 2018-04-05
Payer: MEDICARE

## 2018-04-05 VITALS — DIASTOLIC BLOOD PRESSURE: 98 MMHG | SYSTOLIC BLOOD PRESSURE: 156 MMHG

## 2018-04-05 DIAGNOSIS — J45.901: Primary | ICD-10-CM

## 2018-04-05 PROCEDURE — 71046 X-RAY EXAM CHEST 2 VIEWS: CPT

## 2018-04-05 PROCEDURE — 94640 AIRWAY INHALATION TREATMENT: CPT

## 2018-04-05 PROCEDURE — 99282 EMERGENCY DEPT VISIT SF MDM: CPT

## 2018-04-05 PROCEDURE — 99283 EMERGENCY DEPT VISIT LOW MDM: CPT

## 2018-04-05 NOTE — ED PHYSICIAN DOCUMENTATION
PD HPI URI





- Stated complaint


Stated Complaint: COUGH/CONGESTION





- Chief complaint


Chief Complaint: Resp





- History obtained from


History obtained from: Patient, Family





- History of Present Illness


Timing - onset: How many weeks ago (2)


Timing details: Gradual onset, Still present


Associated symptoms: Productive cough.  No: Fever, Chills


Contributing factors: Sick contact


Similar symptoms before: Work up / diagnostics, Treatment


Recently seen: Admitted





- Additional information


Additional information: 





Patient is a 77 year old female who is presenting to the emergency department 

for coughing.  According to patient and daughter, patient had been admitted to 

the hospital for a few days for flu and pneumonia.  Patient was discharged but 

her  then developed pneumonia and flu and is still in the hospital.  

patient still reports a cough and is fixated on the fact that her doctor says 

she needs antibiotics.  Patient's daughter states that she had not been acting 

herself lately ever since leaving the hospital.  





Review of Systems


Constitutional: denies: Fever, Chills


Eyes: reports: Reviewed and negative


Ears: reports: Reviewed and negative


Nose: reports: Congestion.  denies: Rhinorrhea / runny nose


Throat: reports: Reviewed and negative


Cardiac: denies: Chest pain / pressure, Palpitations


Respiratory: reports: Dyspnea, Cough, Wheezing


GI: denies: Nausea, Vomiting


: reports: Reviewed and negative


Skin: denies: Rash, Lesions


Musculoskeletal: denies: Extremity swelling


Neurologic: denies: Generalized weakness, Focal weakness


Psychiatric: reports: Delusions, Anxiety


Immunocompromised: denies: Immunocompromised





PD PAST MEDICAL HISTORY





- Past Medical History


Past Medical History: Yes


Cardiovascular: None


Respiratory: Asthma


Neuro: None


Endocrine/Autoimmune: None


GI: Other


HEENT: Dental implants


Musculoskeletal: Osteoarthritis, Fibromyalgia


Derm: None





- Past Surgical History


Past Surgical History: Yes


/GYN: Tubal ligation, Hysterectomy


HEENT: Cataracts





- Present Medications


Home Medications: 


 Ambulatory Orders











 Medication  Instructions  Recorded  Confirmed


 


Fluticasone [Flonase] 2 spray VICKY DAILY PRN 03/30/16 04/05/18


 


Salmeterol Xinafoate [Serevent 1 puffs INH DAILY 03/30/16 04/05/18





Diskus]   


 


oxyCODONE/ACET 5/325 [Percocet 5 1 tab ORAL Q6HR PRN 04/05/16 04/05/18





mg/325 mg]   


 


Albuterol Sulfate [Proair Hfa 2 puffs INH Q4H PRN 03/29/18 04/05/18





Inhaler]   


 


Esomeprazole Magnesium [Nexium] 20 mg PO QDAC 03/29/18 04/05/18


 


Felodipine [Felodipine ER] 5 mg PO DAILY 03/29/18 04/05/18


 


Fluticasone 220 Mcg [Flovent] 2 puffs INH BID 03/29/18 04/05/18


 


Losartan Potassium 100 mg PO DAILY 03/29/18 04/05/18


 


Montelukast Sodium 10 mg PO QPM 03/29/18 04/05/18


 


Clindamycin HCl [Clindamycin 300MG 300 mg PO BID #8 capsule 03/31/18 04/05/18





CAP]   


 


Methylprednisolone [Medrol] 4 mg PO DAILY #1 tab.ds.pk 03/31/18 04/05/18


 


Oseltamivir [Tamiflu] 75 mg PO BID #8 capsule 03/31/18 04/05/18














- Allergies


Allergies/Adverse Reactions: 


 Allergies











Allergy/AdvReac Type Severity Reaction Status Date / Time


 


amoxicillin trihydrate * Allergy  Hives Verified 04/05/18 19:56





[From Augmentin]     


 


potassium clavulanate * Allergy  Hives Verified 04/05/18 19:56





[From Augmentin]     


 


codeine AdvReac Intermediate Nausea Verified 04/05/18 19:56














- Social History


Does the pt smoke?: No


Smoking Status: Never smoker


Does the pt drink ETOH?: No


Does the pt have substance abuse?: No





- Immunizations


Immunizations are current?: Yes





- POLST


Patient has POLST: No





PD ED PE NORMAL





- Vitals


Vital signs reviewed: Yes





- General


General: Alert and oriented X 3, No acute distress





- HEENT


HEENT: Atraumatic, PERRL





- Neck


Neck: Supple, no meningeal sign, No JVD





- Cardiac


Cardiac: RRR, No murmur





- Abdomen


Abdomen: Soft, Non tender





- Derm


Derm: Normal color, No rash





- Extremities


Extremities: No edema, No calf tenderness / cord





- Neuro


Neuro: Alert and oriented X 3, No motor deficit, Normal speech





PD ED PE EXPANDED





- Respiratory


Respiratory: Wheezing, Right lower lobe, Left lower lobe





- Psych


Psych: Anxious, Agitated





Results





- Vitals


Vitals: 


 Vital Signs - 24 hr











  04/05/18 04/05/18





  19:32 20:25


 


Temperature 37.0 C 


 


Heart Rate 94 98


 


Respiratory 16 18





Rate  


 


Blood Pressure 156/98 H 


 


O2 Saturation 97 








 Oxygen











O2 Source                      Room air

















- Rads (name of study)


  ** chest x-ray


Radiology: Final report received (normal)





PD MEDICAL DECISION MAKING





- ED course


Complexity details: reviewed old records, reviewed results, re-evaluated patient

, considered differential, d/w patient, d/w family


ED course: 





Patient was seen and examined at bedside.  patient was well appearing and not 

hypoxic.  chest x-ray was ordered and patient was treated with duoneb.  When 

patient returned from imaging the results were reviewed and there were no acute 

infiltrates or consolidations.  A lengthy discussion was had with the daughter 

concerning patient's mental decline and it was agreed to follow up with the 

doctor this week.  Patient was stable for discharge with outpatient follow up.  





Departure





- Departure


Disposition: 01 Home, Self Care


Clinical Impression: 


 Asthmatic bronchitis with acute exacerbation





Condition: Good


Instructions:  ED Bronchitis Asthmatic


Follow-Up: 


Franc Zurita MD [Primary Care Provider] - Within 3 Days


Comments: 


Your chest x-ray today was within normal limits.  There is no acute pneumonia 

present.  Your symptoms are being caused by bronchitis and you should continue 

with your breathing treatments.  it is important to follow up with your doctor 

next week for re-evaluation.  


Discharge Date/Time: 04/05/18 21:31

## 2018-04-05 NOTE — XRAY REPORT
EXAM:

CHEST RADIOGRAPHY

 

EXAM DATE: 4/5/2018 08:59 PM.

 

CLINICAL HISTORY: Cough.

 

COMPARISON: 03/29/2018.

 

TECHNIQUE: 2 views.

 

FINDINGS: 

Lungs/Pleura: No definite localized infiltrate, consolidation, effusion, or pneumothorax.

 

Mediastinum: Heart and mediastinal contours are unremarkable.

 

Other: Osteopenia, degenerative changes, and other chronic findings.

 

IMPRESSION: No acute disease.

 

[facility]

Referring Provider Line: [phone]

 

SITE ID: [id number]

## 2018-05-03 NOTE — DISCHARGE SUMMARY
Physician: Brigitte Tamayo MD

DATE OF ADMISSION: 03/29/2018

DATE OF DISCHARGE:  03/30/2018

 

HISTORY OF PRESENT ILLNESS:  This is a 77-year-old white female with a history 
of hypertension,

COPD, fibromyalgia, remote history of recurrent syncope that have now resolved.
  The patient 

presented with a 3-4 day history of a wet cough, shortness of breath, fever and 
diarrhea after 

returning from a trip to [place].  The  also had previous URI plus 
diarrhea and was even 

using her inhaler.  The patient was admitted for management of her asthmatic 
bronchitis 

exacerbation and diarrhea.

 

HOSPITAL COURSE AND DISCHARGE DIAGNOSES

1.  Asthmatic bronchitis with exacerbation.  Sputum and blood cultures were 
done.  She was 

started on empiric antibiotics with Zithromax and clindamycin.  Evaluation 
showed positive 

influenza B and she was also put on Tamiflu.  She was started on IV steroids, 
nebulizers of 

Xopenex and budesonide,  Mucinex and Robitussin treatments.  She improved with 
more comfortable

respiratory status the following day and was able to be discharged.

2.  Traveler's diarrhea.  There were no findings of C difficile or bacterial 
positive cultures.

 She was put on a clear liquid diet and advanced as tolerated, received IV 
saline, required 

potassium replacement and her HCTZ was stopped.  The frequency and volume of 
stool was 

decreased and she was able to be discharged.

3.  Hyponatremia.  The patient's admitting labs showed a sodium of 123, which 
improved to 128 

on saline infusion.  She also required potassium replacement with admission 
potassium of 2.9, 

it was 3.6 at discharge.

4.  Influenza B.  The patient was put on Tamiflu and this was continued at 
discharge.



LABS AND IMAGING: reviewed and summarized above.

 

ALLERGIES

1.  AMOXICILLIN.

2.  POTASSIUM CLAVULANATE

3.  CODEINE.

 

DISCHARGE MEDICATIONS

1.  Serevent Diskus.

2.  Flonase nasal spray.

3.  Percocet p.r.n.

4.  Montelukast 10 mg every evening.

5.  Losartan 100 mg daily.

6.  Felodipine ER 5 mg daily.

7.  ProAir HFA inhaler 2 puffs q. 4 hours p.r.n.

8.  Flovent inhaler 2 puffs b.i.d.

9.  Nexium 20 mg daily.

10.  Clindamycin 300 mg b.i.d. for an additional 4 days for sinusitis treatment.

11.  Tamiflu 75 mg b.i.d. for an additional 4 days for positive influenza B.

12.  Medrol Dosepak to taper down as directed.

Her HCTZ was discontinued.

 

PHYSICAL EXAMINATION AT DISCHARGE

VITAL SIGNS: Blood pressure 135/70, pulse of 71 and sinus rhythm, afebrile.  
Room air 

saturation 94%.

HEENT: Unremarkable, except for mild nasal congestion.

NECK: Without JVD or thyromegaly.

CHEST: Clear.

HEART: Sounds normal.

ABDOMEN: Soft, nontender, normal bowel sounds.

EXTREMITIES: Without edema or rash.

NEUROLOGIC: Intact.

 

FOLLOWUP:  Recommended to see her PCP within 1-2 weeks.

 

CODE STATUS:  FULL CODE.

 

Time required to complete this entire discharge, dictation, review of chart and 
medications:  

30 minutes.

 

 

DD: 05/03/2018 15:32

TD: 05/03/2018 18:13

Job #: [id number]

[id number]

## 2018-08-20 ENCOUNTER — HOSPITAL ENCOUNTER (OUTPATIENT)
Dept: HOSPITAL 76 - LAB.WCP | Age: 78
Discharge: HOME | End: 2018-08-20
Attending: FAMILY MEDICINE
Payer: MEDICARE

## 2018-08-20 DIAGNOSIS — E78.5: ICD-10-CM

## 2018-08-20 DIAGNOSIS — I10: Primary | ICD-10-CM

## 2018-08-20 LAB
ALBUMIN DIAFP-MCNC: 4.1 G/DL (ref 3.2–5.5)
ALBUMIN/GLOB SERPL: 1.5 {RATIO} (ref 1–2.2)
ALP SERPL-CCNC: 51 IU/L (ref 42–121)
ALT SERPL W P-5'-P-CCNC: 12 IU/L (ref 10–60)
ANION GAP SERPL CALCULATED.4IONS-SCNC: 8 MMOL/L (ref 6–13)
AST SERPL W P-5'-P-CCNC: 19 IU/L (ref 10–42)
BASOPHILS NFR BLD AUTO: 0.1 10^3/UL (ref 0–0.1)
BASOPHILS NFR BLD AUTO: 1 %
BILIRUB BLD-MCNC: 0.6 MG/DL (ref 0.2–1)
BUN SERPL-MCNC: 12 MG/DL (ref 6–20)
CALCIUM UR-MCNC: 9.2 MG/DL (ref 8.5–10.3)
CHLORIDE SERPL-SCNC: 98 MMOL/L (ref 101–111)
CHOLEST SERPL-MCNC: 182 MG/DL
CO2 SERPL-SCNC: 30 MMOL/L (ref 21–32)
CREAT SERPLBLD-SCNC: 0.5 MG/DL (ref 0.4–1)
EOSINOPHIL # BLD AUTO: 0.2 10^3/UL (ref 0–0.7)
EOSINOPHIL NFR BLD AUTO: 3.3 %
ERYTHROCYTE [DISTWIDTH] IN BLOOD BY AUTOMATED COUNT: 13.4 % (ref 12–15)
GFRSERPLBLD MDRD-ARVRAT: 119 ML/MIN/{1.73_M2} (ref 89–?)
GLOBULIN SER-MCNC: 2.7 G/DL (ref 2.1–4.2)
GLUCOSE SERPL-MCNC: 87 MG/DL (ref 70–100)
HDLC SERPL-MCNC: 69 MG/DL
HDLC SERPL: 2.6 {RATIO} (ref ?–4.4)
HGB UR QL STRIP: 13.5 G/DL (ref 12–16)
LDLC SERPL CALC-MCNC: 99 MG/DL
LDLC/HDLC SERPL: 1.4 {RATIO} (ref ?–4.4)
LYMPHOCYTES # SPEC AUTO: 3.2 10^3/UL (ref 1.5–3.5)
LYMPHOCYTES NFR BLD AUTO: 53.6 %
MCH RBC QN AUTO: 30.5 PG (ref 27–31)
MCHC RBC AUTO-ENTMCNC: 34.1 G/DL (ref 32–36)
MCV RBC AUTO: 89.6 FL (ref 81–99)
MONOCYTES # BLD AUTO: 0.6 10^3/UL (ref 0–1)
MONOCYTES NFR BLD AUTO: 10.2 %
NEUTROPHILS # BLD AUTO: 1.9 10^3/UL (ref 1.5–6.6)
NEUTROPHILS # SNV AUTO: 5.9 X10^3/UL (ref 4.8–10.8)
NEUTROPHILS NFR BLD AUTO: 31.9 %
PDW BLD AUTO: 8.1 FL (ref 7.9–10.8)
PLATELET # BLD: 324 10^3/UL (ref 130–450)
PROT SPEC-MCNC: 6.8 G/DL (ref 6.7–8.2)
RBC MAR: 4.43 10^6/UL (ref 4.2–5.4)
SODIUM SERPLBLD-SCNC: 136 MMOL/L (ref 135–145)
VLDLC SERPL-SCNC: 14 MG/DL

## 2018-08-20 PROCEDURE — 80061 LIPID PANEL: CPT

## 2018-08-20 PROCEDURE — 80053 COMPREHEN METABOLIC PANEL: CPT

## 2018-08-20 PROCEDURE — 36415 COLL VENOUS BLD VENIPUNCTURE: CPT

## 2018-08-20 PROCEDURE — 85025 COMPLETE CBC W/AUTO DIFF WBC: CPT

## 2018-08-20 PROCEDURE — 83721 ASSAY OF BLOOD LIPOPROTEIN: CPT

## 2024-09-05 ENCOUNTER — HOSPITAL ENCOUNTER (EMERGENCY)
Dept: HOSPITAL 76 - ED | Age: 84
Discharge: HOME | End: 2024-09-05
Payer: MEDICARE

## 2024-09-05 VITALS — OXYGEN SATURATION: 94 % | SYSTOLIC BLOOD PRESSURE: 131 MMHG | DIASTOLIC BLOOD PRESSURE: 82 MMHG

## 2024-09-05 DIAGNOSIS — E87.6: ICD-10-CM

## 2024-09-05 DIAGNOSIS — J45.909: ICD-10-CM

## 2024-09-05 DIAGNOSIS — Z79.899: ICD-10-CM

## 2024-09-05 DIAGNOSIS — F03.90: ICD-10-CM

## 2024-09-05 DIAGNOSIS — K92.0: Primary | ICD-10-CM

## 2024-09-05 DIAGNOSIS — E86.0: ICD-10-CM

## 2024-09-05 DIAGNOSIS — N17.9: ICD-10-CM

## 2024-09-05 LAB
ALBUMIN DIAFP-MCNC: 3.8 G/DL (ref 3.2–5.5)
ALBUMIN/GLOB SERPL: 1.5 {RATIO} (ref 1–2.2)
ALP SERPL-CCNC: 65 IU/L (ref 42–121)
ALT SERPL W P-5'-P-CCNC: 8 IU/L (ref 10–60)
ANION GAP SERPL CALCULATED.4IONS-SCNC: 8 MMOL/L (ref 6–13)
AST SERPL W P-5'-P-CCNC: 18 IU/L (ref 10–42)
BASOPHILS NFR BLD AUTO: 0 10^3/UL (ref 0–0.1)
BASOPHILS NFR BLD AUTO: 0.7 %
BILIRUB BLD-MCNC: 0.3 MG/DL (ref 0.2–1)
BUN SERPL-MCNC: 29 MG/DL (ref 6–20)
CALCIUM UR-MCNC: 9.8 MG/DL (ref 8.5–10.3)
CHLORIDE SERPL-SCNC: 98 MMOL/L (ref 101–111)
CO2 SERPL-SCNC: 33 MMOL/L (ref 21–32)
CREAT SERPLBLD-SCNC: 1.5 MG/DL (ref 0.6–1.3)
EOSINOPHIL # BLD AUTO: 0 10^3/UL (ref 0–0.7)
EOSINOPHIL NFR BLD AUTO: 0.2 %
ERYTHROCYTE [DISTWIDTH] IN BLOOD BY AUTOMATED COUNT: 12.6 % (ref 12–15)
GFRSERPLBLD MDRD-ARVRAT: 33 ML/MIN/{1.73_M2} (ref 89–?)
GLOBULIN SER-MCNC: 2.6 G/DL (ref 2.1–4.2)
GLUCOSE SERPL-MCNC: 105 MG/DL (ref 74–104)
HCT VFR BLD AUTO: 37.9 % (ref 37–47)
HGB UR QL STRIP: 12.4 G/DL (ref 12–16)
LIPASE SERPL-CCNC: 20 U/L (ref 11–82)
LYMPHOCYTES # SPEC AUTO: 2 10^3/UL (ref 1.5–3.5)
LYMPHOCYTES NFR BLD AUTO: 33.1 %
MCH RBC QN AUTO: 30.4 PG (ref 27–31)
MCHC RBC AUTO-ENTMCNC: 32.7 G/DL (ref 32–36)
MCV RBC AUTO: 92.9 FL (ref 81–99)
MONOCYTES # BLD AUTO: 0.9 10^3/UL (ref 0–1)
MONOCYTES NFR BLD AUTO: 15 %
NEUTROPHILS # BLD AUTO: 3 10^3/UL (ref 1.5–6.6)
NEUTROPHILS # SNV AUTO: 6 X10^3/UL (ref 4.8–10.8)
NEUTROPHILS NFR BLD AUTO: 50.3 %
NRBC # BLD AUTO: 0 /100WBC
NRBC # BLD AUTO: 0 X10^3/UL
PDW BLD AUTO: 9.5 FL (ref 7.9–10.8)
PLATELET # BLD: 235 10^3/UL (ref 130–450)
POTASSIUM SERPL-SCNC: 3.2 MMOL/L (ref 3.5–4.5)
PROT SPEC-MCNC: 6.4 G/DL (ref 6.4–8.9)
RBC MAR: 4.08 10^6/UL (ref 4.2–5.4)
SODIUM SERPLBLD-SCNC: 139 MMOL/L (ref 135–145)

## 2024-09-05 PROCEDURE — 71045 X-RAY EXAM CHEST 1 VIEW: CPT

## 2024-09-05 PROCEDURE — 85025 COMPLETE CBC W/AUTO DIFF WBC: CPT

## 2024-09-05 PROCEDURE — 36415 COLL VENOUS BLD VENIPUNCTURE: CPT

## 2024-09-05 PROCEDURE — 99283 EMERGENCY DEPT VISIT LOW MDM: CPT

## 2024-09-05 PROCEDURE — 83690 ASSAY OF LIPASE: CPT

## 2024-09-05 PROCEDURE — 99284 EMERGENCY DEPT VISIT MOD MDM: CPT

## 2024-09-05 PROCEDURE — 83605 ASSAY OF LACTIC ACID: CPT

## 2024-09-05 PROCEDURE — 80053 COMPREHEN METABOLIC PANEL: CPT

## 2024-09-05 RX ADMIN — POTASSIUM BICARBONATE STA MEQ: 25 TABLET, EFFERVESCENT ORAL at 18:18

## 2024-09-05 NOTE — ED PHYSICIAN DOCUMENTATION
History of Present Illness





- Stated complaint


Stated Complaint: COUGHING UP BLOOD





- Chief complaint


Chief Complaint: Resp





- History obtained from


History obtained from: Patient, Family (daughter)





- History of Present Illness


Timing: How many weeks ago (2)





- Additonal information


Additional information: 





Macy Butler is an 84-year-old female who has previously been admitted to the 

hospital with pneumonia and was thought to have COPD.  She has been tested by 

the pulmonologist and apparently does not have COPD.  The patient herself is 

demented and is not able to give an adequate history.  When I first encountered 

her she was not sure why she was here she felt that her daughter might have been

frightened by the blood she coughed up and she tells me that she has been doing 

this for years.  Later with the daughter and in the room the patient states 

maybe it has been 2 weeks.  The patient is not overtly short of breath she is 

not currently coughing does not have a fever and does not feel ill.  The 

daughter is concerned about the possibility of cancer and was looking to get an 

x-ray of her chest.





Review of Systems


Constitutional: denies: Fever


Eyes: denies: Decreased vision


Ears: denies: Ear pain


Nose: denies: Rhinorrhea / runny nose, Congestion


Throat: denies: Sore throat


Cardiac: denies: Chest pain / pressure, Palpitations


Respiratory: reports: Cough, Hemoptysis.  denies: Dyspnea


GI: denies: Abdominal Pain, Nausea, Vomiting


: denies: Dysuria, Frequency





PD PAST MEDICAL HISTORY





- Past Medical History


Past Medical History: Yes


Cardiovascular: None


Respiratory: Asthma


Neuro: Dementia


Endocrine/Autoimmune: None


GI: Other


HEENT: Dental implants


Musculoskeletal: Osteoarthritis, Fibromyalgia


Derm: None





- Past Surgical History


Past Surgical History: Yes


/GYN: Tubal ligation, Hysterectomy


HEENT: Cataracts





- Present Medications


Home Medications: 


                                Ambulatory Orders











 Medication  Instructions  Recorded  Confirmed


 


Fluticasone [Flonase] 2 spray VICKY DAILY PRN 03/30/16 04/05/18


 


Salmeterol Xinafoate [Serevent 1 puffs INH DAILY 03/30/16 04/05/18





Diskus]   


 


oxyCODONE/ACET 5/325 [Percocet 5 1 tab ORAL Q6HR PRN 04/05/16 04/05/18





mg/325 mg]   


 


Albuterol Sulfate [Proair Hfa 2 puffs INH Q4H PRN 03/29/18 04/05/18





Inhaler]   


 


Esomeprazole Magnesium [Nexium] 20 mg PO QDAC 03/29/18 04/05/18


 


Felodipine [Felodipine ER] 5 mg PO DAILY 03/29/18 04/05/18


 


Fluticasone 220 Mcg [Flovent] 2 puffs INH BID 03/29/18 04/05/18


 


Losartan Potassium 100 mg PO DAILY 03/29/18 04/05/18


 


Montelukast Sodium 10 mg PO QPM 03/29/18 04/05/18


 


Clindamycin HCl [Clindamycin 300MG 300 mg PO BID #8 capsule 03/31/18 04/05/18





CAP]   


 


Oseltamivir [Tamiflu] 75 mg PO BID #8 capsule 03/31/18 04/05/18


 


methylPREDNISolone [Medrol] 4 mg PO DAILY #1 tab.ds.pk 03/31/18 04/05/18














- Allergies


Allergies/Adverse Reactions: 


                                    Allergies











Allergy/AdvReac Type Severity Reaction Status Date / Time


 


amoxicillin trihydrate * Allergy  Hives Verified 04/05/18 19:56





[From Augmentin]     


 


potassium clavulanate * Allergy  Hives Verified 04/05/18 19:56





[From Augmentin]     


 


codeine AdvReac Intermediate Nausea Verified 04/05/18 19:56














- Social History


Does the pt smoke?: No


Smoking Status: Never smoker


Does the pt drink ETOH?: No


Does the pt have substance abuse?: No





- Immunizations


Immunizations are current?: Yes





- POLST


Patient has POLST: No





PD ED PE NORMAL





- Vitals


Vital signs reviewed: Yes (wide pulse pressure )





- General


General: No acute distress, Well developed/nourished





- HEENT


HEENT: Atraumatic, PERRL, EOMI





- Neck


Neck: Supple, no meningeal sign, No bony TTP





- Cardiac


Cardiac: RRR, No murmur





- Respiratory


Respiratory: No respiratory distress, Clear bilaterally





- Abdomen


Abdomen: Soft, Non tender





- Back


Back: No CVA TTP, No spinal TTP





- Derm


Derm: Normal color, Warm and dry, No rash





- Extremities


Extremities: No deformity, No edema





- Neuro


Neuro: CNs 2-12 intact, No motor deficit, No sensory deficit, Normal speech


Eye Opening: Spontaneous


Motor: Obeys Commands


Verbal: Confused


GCS Score: 14





- Psych


Psych: Normal mood, Normal affect





Results





- Vitals


Vitals: 


                               Vital Signs - 24 hr











  09/05/24 09/05/24 09/05/24





  13:53 15:28 15:42


 


Temperature 36.6 C  37.0 C


 


Heart Rate 60  81


 


Respiratory 16 17 18





Rate   


 


Blood Pressure 116/54 L  123/54 L


 


O2 Saturation 96  95














  09/05/24





  17:31


 


Temperature 


 


Heart Rate 71


 


Respiratory 16





Rate 


 


Blood Pressure 131/82 H


 


O2 Saturation 94








                                     Oxygen











O2 Source                      Room air

















- Labs


Labs: 


                                Laboratory Tests











  09/05/24 09/05/24 09/05/24





  17:13 17:13 17:13


 


WBC  6.0  


 


RBC  4.08 L  


 


Hgb  12.4  


 


Hct  37.9  


 


MCV  92.9  


 


MCH  30.4  


 


MCHC  32.7  


 


RDW  12.6  


 


Plt Count  235  


 


MPV  9.5  


 


Neut # (Auto)  3.0  


 


Lymph # (Auto)  2.0  


 


Mono # (Auto)  0.9  


 


Eos # (Auto)  0.0  


 


Baso # (Auto)  0.0  


 


Absolute Nucleated RBC  0.00  


 


Nucleated RBC %  0.0  


 


Sodium   139 


 


Potassium   3.2 L 


 


Chloride   98 L 


 


Carbon Dioxide   33 H 


 


Anion Gap   8.0 


 


BUN   29 H 


 


Creatinine   1.5 H 


 


Estimated GFR (MDRD)   33 L 


 


Glucose   105 H 


 


Lactic Acid    0.8


 


Calcium   9.8 


 


Total Bilirubin   0.3 


 


AST   18 


 


ALT   8 L 


 


Alkaline Phosphatase   65 


 


Total Protein   6.4 


 


Albumin   3.8 


 


Globulin   2.6 


 


Albumin/Globulin Ratio   1.5 


 


Lipase   20 














- Rads (name of study)


  ** chest


Relevant Findings:: Prelim report reviewed (Impression: No acute cardiopulmonary

process.), EMP independent interpretation of test (On my read this looks like a 

dry chest.)





Procedures





- IVC sono (time)


  ** 1800


Bedside IVC sono: IVC measures (cm) (0.95), Dehydration (es t1-2 liter deficit)





PD Medical Decision Making





- ED course


Complexity details: reviewed old records, reviewed results, re-evaluated 

patient, considered differential, d/w patient, d/w family


Reviewed Lab Results: 





We reviewed a complete blood count showing a normal white blood cell count 

normal hemoglobin hematocrit and platelets chemistries were with a potassium low

at 3.2 BUN elevated at 29 and creatinine elevated at 1.5 these were both records

for the patient.  Lactate is normal at 0.8.  Liver functions were normal.I 

interpreted these laboratory studies to indicate that if the patient has been 

coughing up blood she has not coughed enough blood up to change her blood counts

there is no evidence of overwhelming infection with a normal white blood cell 

count.  The potassium low at 3.2 is consistent with mild hypokalemia and the 

elevated BUN and creatinine are consistent with the level of dehydration found 

on interrogation of the IVC with POCUS.  This indicates some dehydration causing

mild acute kidney injury.





Departure





- Departure


Disposition: 01 Home, Self Care


Clinical Impression: 


 Hypokalemia, Dehydration





Condition: Stable


Instructions:  Hypokalemia Dc, ED Dehydration


Follow-Up: 


your, doctor [Other]


Comments: 


Macy, today we were able to perform a chest x-ray which was a normal appearing

 chest. No evidence of a reason for hemoptysis or coughing up blood.  We did not

 find any other abnormalities on your blood work to indicate infection.  We did 

find evidence of dehydration and my recommendation is to drink 2 L of fluid 

today and do this daily.  There is some evidence of acute kidney injury 

associated with this dehydration.  A follow-up with your primary care doctor is 

indicated for repeat blood work.  Today we also found your potassium was mildly 

decreased and we have given you some potassium supplement.  A repeat of your 

potassium is indicated as well.  Follow-up with your primary care doctor.


Forms:  PCP List

## 2024-09-05 NOTE — XRAY REPORT
PROCEDURE:  Chest 1V

 

INDICATIONS:  chest pain

 

TECHNIQUE:  One view of the chest was acquired.  

 

COMPARISON:  Chest radiograph 4/5/2018, 3/30/2018

 

FINDINGS:  

 

Surgical changes and devices:  None.  

 

Lungs and pleura:  No pleural effusions or pneumothorax.  Lungs are clear.  

 

Mediastinum:  Mediastinal contours appear normal.  Heart size is normal.  

 

Bones and chest wall:  No suspicious bony lesions.  Overlying soft tissues appear unremarkable.  

 

 

IMPRESSION:  

 

No acute cardiopulmonary process.

 

 

 

Reviewed by: Mariela Munoz MD, PhD on 9/5/2024 5:06 PM PDT

Approved by: Mariela Munoz MD, PhD on 9/5/2024 5:06 PM PDT

 

 

Station ID:  [id number]

## 2024-09-18 ENCOUNTER — HOSPITAL ENCOUNTER (EMERGENCY)
Dept: HOSPITAL 76 - ED | Age: 84
Discharge: HOME | End: 2024-09-18
Payer: MEDICARE

## 2024-09-18 ENCOUNTER — HOSPITAL ENCOUNTER (OUTPATIENT)
Dept: HOSPITAL 76 - EMS | Age: 84
Discharge: ANOTHER HOSPITAL | End: 2024-09-18
Payer: MEDICARE

## 2024-09-18 VITALS — OXYGEN SATURATION: 93 % | SYSTOLIC BLOOD PRESSURE: 116 MMHG | DIASTOLIC BLOOD PRESSURE: 61 MMHG

## 2024-09-18 DIAGNOSIS — J44.0: ICD-10-CM

## 2024-09-18 DIAGNOSIS — R05.9: Primary | ICD-10-CM

## 2024-09-18 DIAGNOSIS — J44.1: Primary | ICD-10-CM

## 2024-09-18 DIAGNOSIS — F03.90: ICD-10-CM

## 2024-09-18 DIAGNOSIS — J12.2: ICD-10-CM

## 2024-09-18 DIAGNOSIS — R50.9: ICD-10-CM

## 2024-09-18 DIAGNOSIS — J44.89: ICD-10-CM

## 2024-09-18 LAB
ALBUMIN DIAFP-MCNC: 3.4 G/DL (ref 3.2–5.5)
ALBUMIN/GLOB SERPL: 1 {RATIO} (ref 1–2.2)
ALP SERPL-CCNC: 60 IU/L (ref 42–121)
ALT SERPL W P-5'-P-CCNC: 14 IU/L (ref 10–60)
ANION GAP SERPL CALCULATED.4IONS-SCNC: 9 MMOL/L (ref 6–13)
AST SERPL W P-5'-P-CCNC: 17 IU/L (ref 10–42)
B PARAPERT DNA SPEC QL NAA+PROBE: NOT DETECTED
B PERT DNA SPEC QL NAA+PROBE: NOT DETECTED
BASOPHILS NFR BLD AUTO: 0.1 10^3/UL (ref 0–0.1)
BASOPHILS NFR BLD AUTO: 0.5 %
BILIRUB BLD-MCNC: 0.7 MG/DL (ref 0.2–1)
BUN SERPL-MCNC: 33 MG/DL (ref 6–20)
C PNEUM DNA NPH QL NAA+NON-PROBE: NOT DETECTED
CALCIUM UR-MCNC: 9.9 MG/DL (ref 8.5–10.3)
CHLORIDE SERPL-SCNC: 95 MMOL/L (ref 101–111)
CO2 SERPL-SCNC: 31 MMOL/L (ref 21–32)
CREAT SERPLBLD-SCNC: 1.2 MG/DL (ref 0.6–1.3)
EOSINOPHIL # BLD AUTO: 0.1 10^3/UL (ref 0–0.7)
EOSINOPHIL NFR BLD AUTO: 0.5 %
ERYTHROCYTE [DISTWIDTH] IN BLOOD BY AUTOMATED COUNT: 12.7 % (ref 12–15)
FLUAV RNA RESP QL NAA+PROBE: NOT DETECTED
GFRSERPLBLD MDRD-ARVRAT: 43 ML/MIN/{1.73_M2} (ref 89–?)
GLOBULIN SER-MCNC: 3.4 G/DL (ref 2.1–4.2)
GLUCOSE SERPL-MCNC: 99 MG/DL (ref 74–104)
HAEM INFLU B DNA SPEC QL NAA+PROBE: NOT DETECTED
HCOV 229E RNA SPEC QL NAA+PROBE: NOT DETECTED
HCOV HKU1 RNA UPPER RESP QL NAA+PROBE: NOT DETECTED
HCOV NL63 RNA ASPIRATE QL NAA+PROBE: NOT DETECTED
HCOV OC43 RNA SPEC QL NAA+PROBE: NOT DETECTED
HCT VFR BLD AUTO: 35.8 % (ref 37–47)
HGB UR QL STRIP: 11.5 G/DL (ref 12–16)
HMPV AG SPEC QL: NOT DETECTED
HPIV1 RNA NPH QL NAA+PROBE: NOT DETECTED
HPIV2 SPEC QL CULT: NOT DETECTED
HPIV3 AB TITR SER CF: DETECTED {TITER}
HPIV4 RNA SPEC QL NAA+PROBE: NOT DETECTED
LIPASE SERPL-CCNC: 31 U/L (ref 11–82)
LYMPHOCYTES # SPEC AUTO: 2 10^3/UL (ref 1.5–3.5)
LYMPHOCYTES NFR BLD AUTO: 15 %
M PNEUMO DNA SPEC QL NAA+PROBE: NOT DETECTED
MAGNESIUM SERPL-MCNC: 1.6 MG/DL (ref 1.7–2.3)
MCH RBC QN AUTO: 29.4 PG (ref 27–31)
MCHC RBC AUTO-ENTMCNC: 32.1 G/DL (ref 32–36)
MCV RBC AUTO: 91.6 FL (ref 81–99)
MONOCYTES # BLD AUTO: 0.8 10^3/UL (ref 0–1)
MONOCYTES NFR BLD AUTO: 6.1 %
NEUTROPHILS # BLD AUTO: 10.1 10^3/UL (ref 1.5–6.6)
NEUTROPHILS # SNV AUTO: 13.3 X10^3/UL (ref 4.8–10.8)
NEUTROPHILS NFR BLD AUTO: 76.2 %
NRBC # BLD AUTO: 0 /100WBC
NRBC # BLD AUTO: 0 X10^3/UL
PDW BLD AUTO: 8.9 FL (ref 7.9–10.8)
PLATELET # BLD: 411 10^3/UL (ref 130–450)
POTASSIUM SERPL-SCNC: 3.1 MMOL/L (ref 3.5–4.5)
PROT SPEC-MCNC: 6.8 G/DL (ref 6.4–8.9)
RBC MAR: 3.91 10^6/UL (ref 4.2–5.4)
RSV RNA RESP QL NAA+PROBE: NOT DETECTED
RV+EV RNA SPEC QL NAA+PROBE: NOT DETECTED
SARS-COV-2 RNA PNL SPEC NAA+PROBE: NOT DETECTED
SODIUM SERPLBLD-SCNC: 135 MMOL/L (ref 135–145)

## 2024-09-18 PROCEDURE — 80053 COMPREHEN METABOLIC PANEL: CPT

## 2024-09-18 PROCEDURE — 94640 AIRWAY INHALATION TREATMENT: CPT

## 2024-09-18 PROCEDURE — 83735 ASSAY OF MAGNESIUM: CPT

## 2024-09-18 PROCEDURE — 85025 COMPLETE CBC W/AUTO DIFF WBC: CPT

## 2024-09-18 PROCEDURE — 99284 EMERGENCY DEPT VISIT MOD MDM: CPT

## 2024-09-18 PROCEDURE — 83690 ASSAY OF LIPASE: CPT

## 2024-09-18 PROCEDURE — 71045 X-RAY EXAM CHEST 1 VIEW: CPT

## 2024-09-18 PROCEDURE — 87633 RESP VIRUS 12-25 TARGETS: CPT

## 2024-09-18 PROCEDURE — 94664 DEMO&/EVAL PT USE INHALER: CPT

## 2024-09-18 PROCEDURE — 36415 COLL VENOUS BLD VENIPUNCTURE: CPT

## 2024-09-18 RX ADMIN — ALBUTEROL SULFATE STA MG: 2.5 SOLUTION RESPIRATORY (INHALATION) at 06:33

## 2024-09-18 RX ADMIN — HYDROCODONE BITARTRATE AND ACETAMINOPHEN STA TAB: 5; 325 TABLET ORAL at 07:46

## 2024-09-18 RX ADMIN — DOXYCYCLINE HYCLATE STA MG: 100 TABLET, COATED ORAL at 07:46

## 2024-09-18 RX ADMIN — IPRATROPIUM BROMIDE AND ALBUTEROL SULFATE STA ML: 2.5; .5 SOLUTION RESPIRATORY (INHALATION) at 07:47

## 2024-09-18 NOTE — XRAY REPORT
PROCEDURE:  Chest 1V

 

INDICATIONS:  chest pain

 

TECHNIQUE:  One view of the chest was acquired.  

 

COMPARISON:  9/5/2024.

 

FINDINGS:  

 

Surgical changes and devices:  None.  

 

Lungs and pleura:  No pleural effusions or pneumothorax. Patchy density, retrocardiac region of left 
lower lobe.

 

Mediastinum:  Mediastinal contours appear normal.  Heart size is normal.  

 

Bones and chest wall:  No suspicious bony lesions.  Overlying soft tissues appear unremarkable.  

 

 

IMPRESSION:  

 

Focal pneumonia versus atelectasis, left lower lobe.

 

Progress films are recommended until clear. 

 

Reviewed by: Isaias Askew MD on 9/18/2024 8:01 AM PDT

Approved by: Isaias Askew MD on 9/18/2024 8:01 AM PDT

 

 

Station ID:  [id number]